# Patient Record
Sex: FEMALE | Race: WHITE | Employment: UNEMPLOYED | URBAN - METROPOLITAN AREA
[De-identification: names, ages, dates, MRNs, and addresses within clinical notes are randomized per-mention and may not be internally consistent; named-entity substitution may affect disease eponyms.]

---
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
## 2025-01-04 ENCOUNTER — HEALTH MAINTENANCE LETTER (OUTPATIENT)
Age: 20
End: 2025-01-04

## 2025-01-04 ENCOUNTER — E-VISIT (OUTPATIENT)
Dept: URGENT CARE | Facility: CLINIC | Age: 20
End: 2025-01-04

## 2025-01-04 DIAGNOSIS — N39.0 ACUTE UTI (URINARY TRACT INFECTION): Primary | ICD-10-CM

## 2025-01-04 RX ORDER — NITROFURANTOIN 25; 75 MG/1; MG/1
100 CAPSULE ORAL 2 TIMES DAILY
Qty: 10 CAPSULE | Refills: 0 | Status: SHIPPED | OUTPATIENT
Start: 2025-01-04 | End: 2025-01-09

## 2025-01-04 NOTE — PATIENT INSTRUCTIONS
Dear Kelsey Stephen    After reviewing your responses, I've been able to diagnose you with a urinary tract infection, which is a common infection of the bladder with bacteria.  This is not a sexually transmitted infection, though urinating immediately after intercourse can help prevent infections.  Drinking lots of fluids is also helpful to clear your current infection and prevent the next one.      I have sent a prescription for antibiotics to your pharmacy to treat this infection.    It is important that you take all of your prescribed medication even if your symptoms are improving after a few doses.  Taking all of your medicine helps prevent the symptoms from returning.     If your symptoms worsen, you develop pain in your back or stomach, develop fevers, or are not improving in 5 days, please contact your primary care provider for an appointment or visit any of our convenient Walk-in or Urgent Care Centers to be seen, which can be found on our website here.    Thanks again for choosing us as your health care partner,    MARY ORDAZ CNP  Urinary Tract Infection (UTI) in Women: Care Instructions  Overview     A urinary tract infection (UTI) is an infection caused by bacteria. It can happen anywhere in the urinary tract. A UTI can happen in the:  Kidneys.  Ureters, the tubes that connect the kidneys to the bladder.  Bladder.  Urethra, where the urine comes out.  Most UTIs are bladder infections. They often cause pain or burning when you urinate.  Most UTIs can be cured with antibiotics. If you are prescribed antibiotics, be sure to complete your treatment so that the infection does not get worse.  Follow-up care is a key part of your treatment and safety. Be sure to make and go to all appointments, and call your doctor if you are having problems. It's also a good idea to know your test results and keep a list of the medicines you take.  How can you care for yourself at home?  Take your  antibiotics as directed. Do not stop taking them just because you feel better. You need to take the full course of antibiotics.  Drink extra water and other fluids for the next day or two. This will help make the urine less concentrated and help wash out the bacteria that are causing the infection. (If you have kidney, heart, or liver disease and have to limit fluids, talk with your doctor before you increase the amount of fluids you drink.)  Avoid drinks that are carbonated or have caffeine. They can irritate the bladder.  Urinate often. Try to empty your bladder each time.  To relieve pain, take a hot bath or lay a heating pad set on low over your lower belly or genital area. Never go to sleep with a heating pad in place.  To prevent UTIs  Drink plenty of water each day. This helps you urinate often, which clears bacteria from your system. (If you have kidney, heart, or liver disease and have to limit fluids, talk with your doctor before you increase the amount of fluids you drink.)  Urinate when you need to.  If you are sexually active, urinate right after you have sex.  Change sanitary pads often.  Avoid douches, bubble baths, feminine hygiene sprays, and other feminine hygiene products that have deodorants.  After going to the bathroom, wipe from front to back.  When should you call for help?   Call your doctor now or seek immediate medical care if:    You have new or worse fever, chills, nausea, or vomiting.     You have new pain in your back just below your rib cage. This is called flank pain.     There is new blood or pus in your urine.     You have any problems with your antibiotic medicine.   Watch closely for changes in your health, and be sure to contact your doctor if:    You are not getting better after taking an antibiotic for 2 days.     Your symptoms go away but then come back.   Where can you learn more?  Go to https://www.healthwise.net/patiented  Enter K848 in the search box to learn more about  "\"Urinary Tract Infection (UTI) in Women: Care Instructions.\"  Current as of: April 30, 2024  Content Version: 14.3    2024 [employer].   Care instructions adapted under license by your healthcare professional. If you have questions about a medical condition or this instruction, always ask your healthcare professional. [employer] disclaims any warranty or liability for your use of this information.    "

## 2025-01-11 NOTE — MR AVS SNAPSHOT
After Visit Summary   10/19/2018    Kelsey Stephen    MRN: [id number]           Patient Information     Date Of Birth          2005        Visit Information        Provider Department      10/19/2018 2:00 PM Count includes the [facility] FLU SHOT CLINIC [facility]        Today's Diagnoses     Need for prophylactic vaccination and inoculation against influenza    -  1       Follow-ups after your visit        Who to contact     If you have questions or need follow up information about today's clinic visit or your schedule please contact [facility] directly at [phone].  Normal or non-critical lab and imaging results will be communicated to you by [employer], letter or phone within 4 business days after the clinic has received the results. If you do not hear from us within 7 days, please contact the clinic through [employer] or phone. If you have a critical or abnormal lab result, we will notify you by phone as soon as possible.  Submit refill requests through [employer] or call your pharmacy and they will forward the refill request to us. Please allow 3 business days for your refill to be completed.          Additional Information About Your Visit        MyChart Information     [employer] gives you secure access to your electronic health record. If you see a primary care provider, you can also send messages to your care team and make appointments. If you have questions, please call your primary care clinic.  If you do not have a primary care provider, please call [phone] and they will assist you.        Care EveryWhere ID     This is your Care EveryWhere ID. This could be used by other organizations to access your [facility] medical records  [id number]         Blood Pressure from Last 3 Encounters:   10/11/18 108/60   05/25/18 107/68   04/13/18 105/63    Weight from Last 3 Encounters:   10/11/18 105 lb 12.8 oz (48 kg) (51 %)*   05/25/18 98 lb 8.7 oz (44.7 kg) (43 %)*   04/13/18 95 lb 12.8 oz  (43.5 kg) (39 %)*     * Growth percentiles are based on [facility] 2-20 Years data.              We Performed the Following     FLU VACCINE, SPLIT VIRUS, IM (QUADRIVALENT) [86050]- >3 YRS     Vaccine Administration, Initial [80646]        Primary Care Provider Office Phone # Fax #    Naresh Hammonds [phone] [address] [facility] [address]        Equal Access to Services     YURY DE JESUS : Hadii aad ku hadasho Soomaali, waaxda luqadaha, qaybta kaalmada adeegyada, waxay idiin hayaan adeeg kharashalini lavon . So [facility] [phone].    ATENCIÓN: Si habla español, tiene a holt disposición servicios gratuitos de asistencia lingüística. Llame al [phone].    We comply with applicable federal civil rights laws and [place] laws. We do not discriminate on the basis of race, color, national origin, age, disability, sex, sexual orientation, or gender identity.            Thank you!     Thank you for choosing [facility]  for your care. Our goal is always to provide you with excellent care. Hearing back from our patients is one way we can continue to improve our services. Please take a few minutes to complete the written survey that you may receive in the mail after your visit with us. Thank you!             Your Updated Medication List - Protect others around you: Learn how to safely use, store and throw away your medicines at www.disposemymeds.org.          This list is accurate as of 10/19/18  2:10 PM.  Always use your most recent med list.                   Brand Name Dispense Instructions for use Diagnosis    azithromycin 250 MG tablet    ZITHROMAX    6 tablet    Two tablets first day, then one tablet daily for four days.    Acute suppurative otitis media of both ears without spontaneous rupture of tympanic membranes, recurrence not specified, Sore throat       cetirizine 10 MG tablet    zyrTEC     Take 10 mg by mouth daily as needed for allergies        EPINEPHrine 0.3 MG/0.3ML  injection 2-pack    AUVI-Q    2 mL    Inject 0.3 mLs (0.3 mg) into the muscle as needed for anaphylaxis    Reaction to food, initial encounter       NASACORT AQ 55 MCG/ACT inhaler   Generic drug:  triamcinolone      Spray 1 spray into both nostrils daily Reported on 5/8/2017        ORDER FOR ALLERGEN IMMUNOTHERAPY 5 mL vial     5 mL    Name of Mix: Mix #1  Cat, Dog, Grass, Tree  Cat Hair, Standardized 10,000 BAU/mL, ALK  2.0 ml Dog Hair Dander, A. P. 1:100 w/v, HS  1.0 ml  Birch Mix PRW 1:20 w/v, HS  0.3 ml Oak Mix RVW 1:20 w/v, HS 0.3 ml Grass Mix #7 100,000 BAU/mL, HS 0.2 ml Ze Grass 1:20 w/v, HS 0.5 ml Diluent: HSA qs to 5ml    Non-seasonal allergic rhinitis due to animal hair and dander, Chronic seasonal allergic rhinitis due to pollen       ORDER FOR ALLERGEN IMMUNOTHERAPY 5 mL vial     5 mL    Name of Mix: Mix #2 Tree  Dylan,White 1:20 w/v,HS 0.5ml Boxelder-Maple Mix BHR (Boxelder Hard Red) 1:20 w/v,HS 0.5ml Cedar,Red 1:20 w/v,HS  0.5ml Bollinger,Common 1:20 w/v,HS 0.5ml Elm, American 1:20 w/v,HS  0.5ml Hackberry 1:20 w/v, HS 0.5ml Hickory,Shagbark 1:20 w/v, HS  0.5ml [place] Mix RW 1:20 w/v, HS 0.5ml [place] Tree,Black 1:20 w/v, HS 0.5ml Taylor,Black 1:20 w/v,HS 0.5ml Diluent: HSA qs to 5ml    Non-seasonal allergic rhinitis due to animal hair and dander, Chronic seasonal allergic rhinitis due to pollen       ORDER FOR ALLERGEN IMMUNOTHERAPY 5 mL vial     5 mL    Name of Mix:Mix #3  Weed Cocklebur,Common 1:20 w/v,HS 0.5ml Kochia 1:20 w/v, HS 0.3 ml Lamb's Quarters 1:20 w/v,HS 0.3ml Marshelder-Povertyweed 1:20 w/v,HS 0.3ml Nettle 1:20 w/v HS 0.5ml Pigweed,Careless 1:20 w/v,HS 0.5ml Plantain,English 1:20 w/v,HS 0.5ml Ragweed Mixed 1:20 w/v ALK 0.5ml Russian Thistle 1:20 w/v,HS 0.3ml Sagebrush, Mugwort 1:20 w/v,HS 0.4ml Sorrel, Sheep 1:20 w/v,HS 0.5ml Diluent: HSA qs to 5ml    Non-seasonal allergic rhinitis due to animal hair and dander, Chronic seasonal allergic rhinitis due to pollen          yes

## 2025-07-14 ENCOUNTER — TELEPHONE (OUTPATIENT)
Dept: FAMILY MEDICINE | Facility: CLINIC | Age: 20
End: 2025-07-14

## 2025-07-14 NOTE — TELEPHONE ENCOUNTER
Reason for Call:  Appointment Request    Patient requesting this type of appt:  Strep Test     Requested provider: Any Available provider     Reason patient unable to be scheduled: Not within requested timeframe    When does patient want to be seen/preferred time: Same day    Comments: Pt is not experiencing Covid/flu symptoms but has a sore throat that mom thinks thinks may be strep, pt also had a fever. Pt would like like to be seen asap if a provider is available today.    Could we send this information to you in [facility] or would you prefer to receive a phone call?:   Patient would prefer a phone call   Okay to leave a detailed message?: Yes at Cell number on file:    Telephone Information:   Mobile [phone]       Call taken on 7/14/2025 at 11:12 AM by Stefan Wilkerson

## 2025-07-14 NOTE — TELEPHONE ENCOUNTER
Returned call and spoke with patient.  Since there are unfortunately no clinic appointments available today, RN advised going on [facility] and submitting either E-Visit or virtual visit for symptoms and strep testing. Patient voices understanding.  She was also offered a virtual visit this afternoon with Dr. Shin, but states she'll just log into [facility] and schedule something.    Keiry Marroquin RN  [facility]

## 2025-07-15 ENCOUNTER — OFFICE VISIT (OUTPATIENT)
Dept: FAMILY MEDICINE | Facility: CLINIC | Age: 20
End: 2025-07-15

## 2025-07-15 VITALS
WEIGHT: 121 LBS | HEART RATE: 56 BPM | HEIGHT: 62 IN | SYSTOLIC BLOOD PRESSURE: 104 MMHG | OXYGEN SATURATION: 98 % | BODY MASS INDEX: 22.26 KG/M2 | DIASTOLIC BLOOD PRESSURE: 60 MMHG | TEMPERATURE: 97.1 F | RESPIRATION RATE: 16 BRPM

## 2025-07-15 DIAGNOSIS — B34.9 VIRAL SYNDROME: ICD-10-CM

## 2025-07-15 DIAGNOSIS — J30.1 SEASONAL ALLERGIC RHINITIS DUE TO POLLEN: ICD-10-CM

## 2025-07-15 DIAGNOSIS — H10.13 ALLERGIC CONJUNCTIVITIS OF BOTH EYES: ICD-10-CM

## 2025-07-15 DIAGNOSIS — R07.0 THROAT PAIN: Primary | ICD-10-CM

## 2025-07-15 DIAGNOSIS — J30.81 NON-SEASONAL ALLERGIC RHINITIS DUE TO ANIMAL HAIR AND DANDER: ICD-10-CM

## 2025-07-15 LAB
DEPRECATED S PYO AG THROAT QL EIA: NEGATIVE
S PYO DNA THROAT QL NAA+PROBE: NOT DETECTED

## 2025-07-15 PROCEDURE — 87651 STREP A DNA AMP PROBE: CPT | Performed by: NURSE PRACTITIONER

## 2025-07-15 PROCEDURE — 99203 OFFICE O/P NEW LOW 30 MIN: CPT | Performed by: NURSE PRACTITIONER

## 2025-07-15 RX ORDER — EPINEPHRINE 0.3 MG/.3ML
0.3 INJECTION SUBCUTANEOUS
Qty: 2 EACH | Refills: 1 | Status: SHIPPED | OUTPATIENT
Start: 2025-07-15

## 2025-07-15 ASSESSMENT — PAIN SCALES - GENERAL: PAINLEVEL_OUTOF10: MILD PAIN (2)

## 2025-07-15 NOTE — PROGRESS NOTES
Assessment & Plan     Throat pain  Negative strep testing - culture pending.  See AVS for NSAID recommendations.  Follow-up if not improving.    - Streptococcus A Rapid Screen w/Reflex to PCR - Clinic Collect  - Group A Streptococcus PCR Throat Swab    Viral syndrome       Non-seasonal allergic rhinitis due to animal hair and dander     - EPINEPHrine (AUVI-Q) 0.3 MG/0.3ML injection 2-pack  Dispense: 2 each; Refill: 1    Seasonal allergic rhinitis due to pollen     - EPINEPHrine (AUVI-Q) 0.3 MG/0.3ML injection 2-pack  Dispense: 2 each; Refill: 1    Allergic conjunctivitis of both eyes     - EPINEPHrine (AUVI-Q) 0.3 MG/0.3ML injection 2-pack  Dispense: 2 each; Refill: 1      Follow-up  No follow-ups on file.    Mason Cole is a 20 year old, presenting for the following health issues:  Throat Problem        7/15/2025     9:57 AM   Additional Questions   Roomed by Viridiana French   Accompanied by self         7/15/2025     9:57 AM   Patient Reported Additional Medications   Patient reports taking the following new medications none     History of Present Illness       Reason for visit:  Possible strep  Symptom onset:  1-3 days ago  Symptoms include:  Sore throat  Symptom intensity:  Mild  Symptom progression:  Improving  Had these symptoms before:  Yes  Has tried/received treatment for these symptoms:  Yes  Previous treatment was successful:  Yes  Prior treatment description:  Medication   She is taking medications regularly.        Denies any other symptoms.  Reports low grade fever yesterday - tactile.  Denies shortness of breath, chest pain or nausea/vomiting       Review of Systems  Constitutional, HEENT, cardiovascular, pulmonary, GI, , musculoskeletal, neuro, skin, endocrine and psych systems are negative, except as otherwise noted.      Objective    /60 (BP Location: Right arm, Patient Position: Sitting, Cuff Size: Adult Regular)   Pulse 56   Temp 97.1  F (36.2  C) (Tympanic)   Resp 16    "Ht 1.577 m (5' 2.1\")   Wt 54.9 kg (121 lb)   LMP 07/03/2025 (Approximate)   SpO2 98%   BMI 22.06 kg/m    Body mass index is 22.06 kg/m .  Physical Exam   GENERAL: alert and no distress  HENT: normal cephalic/atraumatic, ear canals and TM's normal, nose and mouth without ulcers or lesions, oropharynx clear, and oral mucous membranes moist  NECK: cervical adenopathy shotty, no asymmetry, masses, or scars, and thyroid normal to palpation  RESP: lungs clear to auscultation - no rales, rhonchi or wheezes  CV: regular rate and rhythm, normal S1 S2, no S3 or S4, no murmur, click or rub, no peripheral edema  ABDOMEN: soft, nontender, no hepatosplenomegaly, no masses and bowel sounds normal  MS: no gross musculoskeletal defects noted, no edema    Results for orders placed or performed in visit on 07/15/25   Streptococcus A Rapid Screen w/Reflex to PCR - Clinic Collect     Status: Normal    Specimen: Throat; Swab   Result Value Ref Range    Group A Strep antigen Negative Negative             Signed Electronically by: Lisa Mendoza DNP    "

## 2025-07-15 NOTE — PATIENT INSTRUCTIONS
Ibuprofen 600 mg every 6-8 hours - take with food.    FOLLOW UP if symptoms not improving.    Lisa Mendoza, DNP

## 2025-08-21 ENCOUNTER — OFFICE VISIT (OUTPATIENT)
Dept: MIDWIFE SERVICES | Facility: CLINIC | Age: 20
End: 2025-08-21
Payer: COMMERCIAL

## 2025-08-21 VITALS
DIASTOLIC BLOOD PRESSURE: 74 MMHG | HEART RATE: 68 BPM | SYSTOLIC BLOOD PRESSURE: 124 MMHG | OXYGEN SATURATION: 97 % | WEIGHT: 119 LBS | BODY MASS INDEX: 21.7 KG/M2

## 2025-08-21 DIAGNOSIS — Z30.017 INSERTION OF IMPLANTABLE SUBDERMAL CONTRACEPTIVE: Primary | ICD-10-CM

## 2025-08-21 LAB — HCG UR QL: NEGATIVE

## (undated) DEVICE — ENDO TUBING W/CAP AUXILARY WATER INLET 100609 EGA-500

## (undated) DEVICE — TUBING SUCTION MEDI-VAC 1/4"X20' N620A

## (undated) DEVICE — KIT CONNECTOR FOR OLYMPUS ENDOSCOPES DEFENDO 100310

## (undated) DEVICE — SOL WATER IRRIG 1000ML BOTTLE 2F7114

## (undated) DEVICE — KIT ENDO TURNOVER/PROCEDURE CARRY-ON 101822

## (undated) DEVICE — ENDO FORCEP ENDOJAW BIOPSY 2.8MMX230CM FB-220U

## (undated) DEVICE — ENDO BITE BLOCK PEDS BATRIK LATEX FREE B1

## (undated) DEVICE — SPECIMEN CONTAINER W/20ML 10% BUFF FORMALIN C4322-11

## (undated) RX ORDER — LIDOCAINE HYDROCHLORIDE 20 MG/ML
INJECTION, SOLUTION EPIDURAL; INFILTRATION; INTRACAUDAL; PERINEURAL
Status: DISPENSED
Start: 2017-09-28

## (undated) RX ORDER — FENTANYL CITRATE 50 UG/ML
INJECTION, SOLUTION INTRAMUSCULAR; INTRAVENOUS
Status: DISPENSED
Start: 2017-09-28

## (undated) RX ORDER — PROPOFOL 10 MG/ML
INJECTION, EMULSION INTRAVENOUS
Status: DISPENSED
Start: 2017-09-28

## (undated) RX ORDER — ONDANSETRON 2 MG/ML
INJECTION INTRAMUSCULAR; INTRAVENOUS
Status: DISPENSED
Start: 2017-09-28